# Patient Record
Sex: FEMALE
[De-identification: names, ages, dates, MRNs, and addresses within clinical notes are randomized per-mention and may not be internally consistent; named-entity substitution may affect disease eponyms.]

---

## 2022-06-09 ENCOUNTER — APPOINTMENT (OUTPATIENT)
Dept: ORTHOPEDIC SURGERY | Facility: CLINIC | Age: 87
End: 2022-06-09
Payer: MEDICARE

## 2022-06-09 VITALS — WEIGHT: 180 LBS | BODY MASS INDEX: 36.29 KG/M2 | HEIGHT: 59 IN

## 2022-06-09 PROCEDURE — 99214 OFFICE O/P EST MOD 30 MIN: CPT | Mod: 25

## 2022-06-09 PROCEDURE — 20610 DRAIN/INJ JOINT/BURSA W/O US: CPT | Mod: LT

## 2022-06-09 PROCEDURE — 73562 X-RAY EXAM OF KNEE 3: CPT | Mod: FY,50

## 2022-06-09 NOTE — ASSESSMENT
[FreeTextEntry1] :  Lengthy discussion regarding options was had with the patient. Nonsurgical options including but not limited to cortisone /maintaining a healthy BMI, bracing, and icing& [facility]  were reviewed.  , benefits and alternatives. \par Patient will have CSI inj today Left Knee \par  All questions were answered.

## 2022-06-09 NOTE — PHYSICAL EXAM
[5___] : hamstring 5[unfilled]/5 [Left] : left knee [advanced tricompartmental OA with medial compartment narrowing and varus alignment] : advanced tricompartmental OA with medial compartment narrowing and varus alignment [Right] : right knee [AP] : anteroposterior [Lateral] : lateral [Components well fixed, in good position] : Components well fixed, in good position [] : ligamentously not stable [FreeTextEntry3] : Hx Lymphedema   Knees in Therapeutic  wrapping   [TWNoteComboBox7] : flexion 100 degrees [de-identified] : extension 0 degrees

## 2022-06-09 NOTE — HISTORY OF PRESENT ILLNESS
[de-identified] : LT knee f/u. Dec 18th Fell down onto a rug and ended up tearing the skin off her knee trying to move from where she fell. Was seen at the hospital and then was in rehab until April. Now her Lt knee is really hurting and swollen. Taking Hydrocodone for pain [FreeTextEntry1] : bilateral knees

## 2022-06-09 NOTE — PROCEDURE
[FreeTextEntry3] : Large joint injection was performed of the Left knee. The indication for this procedure was pain and inflammation. The site was prepped with betadine and sterile technique used. Patient tolerated procedure well. Patient was advised to call if redness, pain or fever occur, apply ice for 15 minutes out of every hour for the next 12-24 hours as tolerated and patient was advised to rest the joint(s) for 1 days.\par Patient has tried OTC's including aspirin, Ibuprofen, Aleve, etc or prescription NSAIDS, and/or exercises at home and/or physical therapy without satisfactory response, patient had decreased mobility in the joint and the risks benefits, and alternatives have been discussed, and verbal consent was obtained.  1cc Celestone &7 CC 1% Lidocaine

## 2023-09-28 ENCOUNTER — APPOINTMENT (OUTPATIENT)
Dept: ORTHOPEDIC SURGERY | Facility: CLINIC | Age: 88
End: 2023-09-28
Payer: MEDICARE

## 2023-09-28 PROCEDURE — 20610 DRAIN/INJ JOINT/BURSA W/O US: CPT | Mod: LT

## 2023-09-28 PROCEDURE — 99214 OFFICE O/P EST MOD 30 MIN: CPT | Mod: 25

## 2024-04-25 ENCOUNTER — APPOINTMENT (OUTPATIENT)
Dept: ORTHOPEDIC SURGERY | Facility: CLINIC | Age: 89
End: 2024-04-25
Payer: MEDICARE

## 2024-04-25 DIAGNOSIS — Z96.651 PRESENCE OF RIGHT ARTIFICIAL KNEE JOINT: ICD-10-CM

## 2024-04-25 DIAGNOSIS — M17.12 UNILATERAL PRIMARY OSTEOARTHRITIS, LEFT KNEE: ICD-10-CM

## 2024-04-25 PROCEDURE — 99214 OFFICE O/P EST MOD 30 MIN: CPT | Mod: 25

## 2024-04-25 PROCEDURE — 20610 DRAIN/INJ JOINT/BURSA W/O US: CPT | Mod: LT

## 2024-04-25 PROCEDURE — 73564 X-RAY EXAM KNEE 4 OR MORE: CPT | Mod: LT

## 2024-04-25 NOTE — PROCEDURE
[Large Joint Injection] : Large joint injection [de-identified] : PROCEDURE:   Large joint injection was performed of the left knee   The indication for this procedure was pain and inflammation. The site was prepped with betadine and sterile technique used. Patient tolerated procedure well. Patient was advised to call if redness, pain or fever occur, apply ice for 15 minutes out of every hour for the next 12-24 hours as tolerated and patient was advised to rest the joint(s) for 1 days.   Injection: 5cc 1% Lidocaine, 1cc 6mg Celestone   Patient has tried OTC's including aspirin, Ibuprofen, Aleve, etc or prescription NSAIDS, and/or exercises at home and/or physical therapy without satisfactory response, patient had decreased mobility in the joint and the risks benefits, and alternatives have been discussed, and verbal consent was obtained.

## 2024-04-25 NOTE — PHYSICAL EXAM
[4___] : hamstring 4[unfilled]/5 [] : uses walker [Left] : left knee [All Views] : anteroposterior, lateral, skyline, and anteroposterior standing [de-identified] : anterior aspect of her knee discoloration from previous hematoma.   [FreeTextEntry9] : Medial joint space narrowing. Sclerosis of the medial knee. Varus deformity. Severe DJD medial compartment. Tricompartmental osteophyte formation. No fractures seen.     [TWNoteComboBox7] : flexion 95 degrees [de-identified] : extension 15 degrees

## 2024-04-25 NOTE — DISCUSSION/SUMMARY
[de-identified] : patient has OA in her left knee  Lengthy discussion regarding options was had with the patient. Nonsurgical options including but not limited to cortisone, Visco supplementation, anti-inflammatory medications (both steroidal and non-steroidal), activity modification, non-impact exercise, maintaining a healthy BMI, bracing, and icing were reviewed. Surgical options including but not limited to arthroscopy, and joint replacement were discussed as was risks, benefits and alternatives.  todays plan is to procced with left CSI, procedure went well.   f/u PRN

## 2024-04-25 NOTE — REASON FOR VISIT
[FreeTextEntry2] : F/U ON LEFT KNEE, patient had CSI 9/28/24 with relief. 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 Patient fell 2-3 years ago on left knee peeled skin off of knee and sewed her skin back together in 2022. Patient takes hydrocodone for her back pain and helps knee as well. 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

## 2024-10-22 ENCOUNTER — APPOINTMENT (OUTPATIENT)
Dept: ORTHOPEDIC SURGERY | Facility: CLINIC | Age: 89
End: 2024-10-22
Payer: MEDICARE

## 2024-10-22 VITALS — BODY MASS INDEX: 36.29 KG/M2 | HEIGHT: 59 IN | WEIGHT: 180 LBS

## 2024-10-22 DIAGNOSIS — M17.12 UNILATERAL PRIMARY OSTEOARTHRITIS, LEFT KNEE: ICD-10-CM

## 2024-10-22 PROCEDURE — 20610 DRAIN/INJ JOINT/BURSA W/O US: CPT | Mod: LT

## 2024-10-22 PROCEDURE — 99213 OFFICE O/P EST LOW 20 MIN: CPT | Mod: 25
